# Patient Record
Sex: MALE | Race: BLACK OR AFRICAN AMERICAN | Employment: FULL TIME | URBAN - METROPOLITAN AREA
[De-identification: names, ages, dates, MRNs, and addresses within clinical notes are randomized per-mention and may not be internally consistent; named-entity substitution may affect disease eponyms.]

---

## 2023-01-13 ENCOUNTER — HOSPITAL ENCOUNTER (OUTPATIENT)
Age: 23
Discharge: HOME | End: 2023-01-13
Payer: COMMERCIAL

## 2023-01-13 VITALS
TEMPERATURE: 98 F | HEART RATE: 80 BPM | SYSTOLIC BLOOD PRESSURE: 119 MMHG | RESPIRATION RATE: 20 BRPM | DIASTOLIC BLOOD PRESSURE: 73 MMHG | OXYGEN SATURATION: 100 %

## 2023-01-13 DIAGNOSIS — L60.0 INGROWN TOENAIL OF RIGHT FOOT WITH INFECTION: Primary | ICD-10-CM

## 2023-01-13 PROCEDURE — 99203 OFFICE O/P NEW LOW 30 MIN: CPT | Performed by: NURSE PRACTITIONER

## 2023-01-13 RX ORDER — CEPHALEXIN 500 MG/1
500 CAPSULE ORAL 3 TIMES DAILY
Qty: 21 CAPSULE | Refills: 0 | Status: SHIPPED | OUTPATIENT
Start: 2023-01-13 | End: 2023-01-20

## 2023-01-13 NOTE — ED INITIAL ASSESSMENT (HPI)
Pt states having ingrown toe on 1st right toe. Pt states concerned for infection. Pt states noticed some oozing form the area.

## 2023-01-27 ENCOUNTER — HOSPITAL ENCOUNTER (OUTPATIENT)
Age: 23
Discharge: HOME | End: 2023-01-27
Payer: COMMERCIAL

## 2023-01-27 VITALS
RESPIRATION RATE: 20 BRPM | SYSTOLIC BLOOD PRESSURE: 111 MMHG | TEMPERATURE: 99 F | HEART RATE: 92 BPM | DIASTOLIC BLOOD PRESSURE: 71 MMHG | OXYGEN SATURATION: 99 %

## 2023-01-27 DIAGNOSIS — Z20.822 ENCOUNTER FOR LABORATORY TESTING FOR COVID-19 VIRUS: ICD-10-CM

## 2023-01-27 DIAGNOSIS — Z20.822 LAB TEST NEGATIVE FOR COVID-19 VIRUS: ICD-10-CM

## 2023-01-27 DIAGNOSIS — J06.9 VIRAL UPPER RESPIRATORY TRACT INFECTION: Primary | ICD-10-CM

## 2023-01-27 LAB
S PYO AG THROAT QL: NEGATIVE
SARS-COV-2 RNA RESP QL NAA+PROBE: NOT DETECTED

## 2023-01-27 PROCEDURE — 87880 STREP A ASSAY W/OPTIC: CPT | Performed by: NURSE PRACTITIONER

## 2023-01-27 PROCEDURE — 99213 OFFICE O/P EST LOW 20 MIN: CPT | Performed by: NURSE PRACTITIONER

## 2023-01-27 PROCEDURE — U0002 COVID-19 LAB TEST NON-CDC: HCPCS | Performed by: NURSE PRACTITIONER

## 2023-01-27 NOTE — ED INITIAL ASSESSMENT (HPI)
Pt here with complaints of sore throat , congestion and cough that began 1 week ago , pt denies any fevers or sob

## 2024-10-08 ENCOUNTER — HOSPITAL ENCOUNTER (OUTPATIENT)
Age: 24
Discharge: HOME | End: 2024-10-08
Payer: COMMERCIAL

## 2024-10-08 VITALS
OXYGEN SATURATION: 98 % | DIASTOLIC BLOOD PRESSURE: 81 MMHG | RESPIRATION RATE: 18 BRPM | SYSTOLIC BLOOD PRESSURE: 129 MMHG | TEMPERATURE: 97 F | HEART RATE: 85 BPM

## 2024-10-08 DIAGNOSIS — M54.2 NECK PAIN: Primary | ICD-10-CM

## 2024-10-08 PROCEDURE — 99215 OFFICE O/P EST HI 40 MIN: CPT | Performed by: NURSE PRACTITIONER

## 2024-10-08 NOTE — DISCHARGE INSTRUCTIONS
I do think you need to be further evaluated for your complaint of a left-sided throbbing neck pain.  However, that is not something we would be able to evaluate here at the immediate care as we are limited as to our resources.  My recommendation would be further evaluation in emergency department where they do have capability to evaluate this type of complaint.  Also provided you with a primary doctor who you may follow-up with.

## 2024-10-08 NOTE — ED PROVIDER NOTES
Patient Seen in: [facility]      History     Chief Complaint   Patient presents with    Neck Pain     Stated Complaint: Neck Pain  Subjective:   HPI    This is a well-appearing 24-year-old male no significant past medical history presents with a chief complaint of left-sided neck throbbing pulsating sensation over the last 2 months.  States that it lasts about 5 minutes when it comes.  Comes multiple times a day.  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  He denies any headache, dizziness feeling lightheaded.  No shortness of breath or chest pain.  Does not have a primary care provider.    Objective:   History reviewed. No pertinent past medical history.         History reviewed. No pertinent surgical history.           Social History     Socioeconomic History    Marital status: Single   Tobacco Use    Smoking status: Never    Smokeless tobacco: Never   Substance and Sexual Activity    Alcohol use: Never    Drug use: Never     Social Determinants of Health      Received from [facility]    Housing Stability            Review of Systems   All other systems reviewed and are negative.      Positive for stated complaint: Neck Pain    Other systems are as noted in HPI.  Constitutional and vital signs reviewed.      All other systems reviewed and negative except as noted above.    Physical Exam     ED Triage Vitals [10/08/24 1704]   /81   Pulse 85   Resp 18   Temp 97 °F (36.1 °C)   Temp src Temporal   SpO2 98 %   O2 Device None (Room air)     Current:/81   Pulse 85   Temp 97 °F (36.1 °C) (Temporal)   Resp 18   SpO2 98%     Physical Exam  Vitals and nursing note reviewed.   Constitutional:       General: He is awake. He is not in acute distress.     Appearance: Normal appearance. He is not ill-appearing, toxic-appearing or diaphoretic.   HENT:      Head: Normocephalic and atraumatic.      Right Ear: Tympanic  membrane, ear canal and external ear normal.      Left Ear: Tympanic membrane, ear canal and external ear normal.      Nose: Nose normal.      Mouth/Throat:      Mouth: Mucous membranes are moist.      Pharynx: Oropharynx is clear. Uvula midline.   Eyes:      General: Lids are normal.      Extraocular Movements: Extraocular movements intact.      Conjunctiva/sclera: Conjunctivae normal.      Pupils: Pupils are equal, round, and reactive to light.   Cardiovascular:      Rate and Rhythm: Normal rate and regular rhythm.      Pulses: Normal pulses.      Heart sounds: Normal heart sounds.   Pulmonary:      Effort: Pulmonary effort is normal.      Breath sounds: Normal breath sounds and air entry. No stridor, decreased air movement or transmitted upper airway sounds.   Skin:     General: Skin is warm and dry.      Capillary Refill: Capillary refill takes less than 2 seconds.   Neurological:      General: No focal deficit present.      Mental Status: He is alert and oriented to person, place, and time.   Psychiatric:         Mood and Affect: Mood normal.         Behavior: Behavior normal. Behavior is cooperative.         Thought Content: Thought content normal.         Judgment: Judgment normal.       ED Course     No results found.  Labs Reviewed - No data to display    MDM     Medical Decision Making  Differential diagnoses reflecting the complexity of care include but are not limited to vascular anomaly, hyperkinetic pulse, coronary artery dissection, muscle neck strain  Comorbidities that add complexity to management include: N/A  History obtained by an independent source was from: N/A  Discussions of management was done with: N/A  My independent interpretations of studies include: N/A  Shared decision making was done by: N/A  Patient is well appearing, non-toxic and in no acute distress.  Vital signs are stable.  I did discuss with patient limitations of IC.  Unable to evaluate his pulsating neck pain here at the  immediate care.  He is not currently having the pain symptoms.  However, I did recommend further evaluation in the emergency department.  I have also given him a primary care provider to follow-up with.  He is requesting a work note that just states he was here today because he does not want his boss to think he \"flaked \".         Disposition and Plan     Clinical Impression:  1. Neck pain         Disposition:  Discharge  10/8/2024  5:18 pm    Follow-up:  Kadi Bergeron MD  [address] [address]  [phone]                Medications Prescribed:  There are no discharge medications for this patient.         Note to patient: The [employer] act makes medical notes like these available to patients in the interest of transparency.  However, be advised this medical document and is intended as peer to peer communication.  It is read the medical language and may contain abbreviations or verbiage that are unfamiliar.  It may appear blunt or direct.  Medical documents are intended to carry relevant information, fax is evident and the clinical opinion of the practitioner.    This note was prepared using Dragon Medical voice recognition dictation software.  As a result, errors may occur.  When identified, these errors have been corrected.  While every attempt is made to correct errors during dictation, discrepancies may still exist.    JACOB Kelley  10/8/2024  5:09 PM

## 2024-10-08 NOTE — ED INITIAL ASSESSMENT (HPI)
Per pt. Noted \"pulsating neck\" started 2 months ago that not getting better. No injury no trauma.

## (undated) NOTE — LETTER
Date & Time: 1/13/2023, 12:33 PM  Patient: Barbara Pi  Encounter Provider(s):    JACOB Soliman       To Whom It May Concern:    Fish Fuller was seen and treated in our department on 1/13/2023. He should not return to work until 1/17/23. If you have any questions or concerns, please do not hesitate to call.       Maru SARKAR  _____________________________  [id number] Signature

## (undated) NOTE — LETTER
Date & Time: 10/8/2024, 5:18 PM  Patient: Feliz Sales  Encounter Provider(s):    Frida Richardson APRN       To Whom It May Concern:    Feliz Sales was seen and treated in our department on 10/8/2024.     If you have any questions or concerns, please do not hesitate to call.        _____________________________  Physician/APC Signature

## (undated) NOTE — LETTER
Date & Time: 1/27/2023, 2:54 PM  Patient: Trinity Castañeda      To Whom It May Concern:    Giovanny Forman was seen and treated in our department on 1/27/2023. He should not return to work until 1/28/23. If you have any questions or concerns, please do not hesitate to call.         __Brittany JAMES___________________________  [id number]/[facility] Signature